# Patient Record
Sex: FEMALE | Race: WHITE | NOT HISPANIC OR LATINO | URBAN - METROPOLITAN AREA
[De-identification: names, ages, dates, MRNs, and addresses within clinical notes are randomized per-mention and may not be internally consistent; named-entity substitution may affect disease eponyms.]

---

## 2022-12-06 ENCOUNTER — OFFICE (OUTPATIENT)
Dept: URBAN - METROPOLITAN AREA CLINIC 67 | Facility: CLINIC | Age: 87
End: 2022-12-06
Payer: MEDICAID

## 2022-12-06 VITALS
WEIGHT: 142 LBS | OXYGEN SATURATION: 90 % | DIASTOLIC BLOOD PRESSURE: 60 MMHG | HEART RATE: 73 BPM | HEIGHT: 60 IN | SYSTOLIC BLOOD PRESSURE: 102 MMHG

## 2022-12-06 DIAGNOSIS — R16.0 HEPATOMEGALY, NOT ELSEWHERE CLASSIFIED: ICD-10-CM

## 2022-12-06 DIAGNOSIS — K76.0 FATTY (CHANGE OF) LIVER, NOT ELSEWHERE CLASSIFIED: ICD-10-CM

## 2022-12-06 DIAGNOSIS — R14.0 ABDOMINAL DISTENSION (GASEOUS): ICD-10-CM

## 2022-12-06 PROCEDURE — 99203 OFFICE O/P NEW LOW 30 MIN: CPT | Performed by: INTERNAL MEDICINE

## 2022-12-06 RX ORDER — LACTOBACIL 2/BIFIDO 1/S.THERMO 450B CELL
PACKET (EA) ORAL
Qty: 12 | Refills: 0 | Status: ACTIVE
Start: 2022-12-06

## 2022-12-06 NOTE — SERVICENOTES
I will check her liver enzymes given the suggestion of fatty liver on CT scan - I will also put her on a trial of a probiotic (samples of VSL #3 provided today) for possible SIBO as a cause for her abdominal bloating. I asked them to call back in 3-4 weeks with an update on how she is doing.

## 2022-12-06 NOTE — SERVICEHPINOTES
Kaci  Shade   is seen for an initial visit today.    
br
br She is accompanied by one of her nurses and reports that for the past 3 months, she has had persistent abdominal distension/bloating with associated postprandial nausea with one episode of emesis. She denies and abdominal pain, dysphagia/odynophagia, GI tract bleeding symptoms or unexplained weight loss. br
br An abdominal ultrasound done on 10/6/22 was remarkable for an enlarged liver and small amount of ascites. However, bra contrasted CT of the abd/pelvis done on 10/21/22 was without any abdominal or pelvic mass or adenopathy. There was no ascites but it did show changes suggestive of mild constipation and fatty liver.

## 2023-01-26 ENCOUNTER — OFFICE (OUTPATIENT)
Dept: URBAN - METROPOLITAN AREA CLINIC 67 | Facility: CLINIC | Age: 88
End: 2023-01-26
Payer: MEDICAID

## 2023-01-26 VITALS
RESPIRATION RATE: 14 BRPM | WEIGHT: 144 LBS | HEIGHT: 60 IN | HEART RATE: 66 BPM | SYSTOLIC BLOOD PRESSURE: 110 MMHG | DIASTOLIC BLOOD PRESSURE: 62 MMHG

## 2023-01-26 DIAGNOSIS — K76.0 FATTY (CHANGE OF) LIVER, NOT ELSEWHERE CLASSIFIED: ICD-10-CM

## 2023-01-26 DIAGNOSIS — Z80.0 FAMILY HISTORY OF MALIGNANT NEOPLASM OF DIGESTIVE ORGANS: ICD-10-CM

## 2023-01-26 DIAGNOSIS — R14.0 ABDOMINAL DISTENSION (GASEOUS): ICD-10-CM

## 2023-01-26 PROCEDURE — 99213 OFFICE O/P EST LOW 20 MIN: CPT | Performed by: INTERNAL MEDICINE

## 2023-01-26 RX ORDER — RIFAXIMIN 550 MG/1
TABLET ORAL
Qty: 36 | Refills: 0 | Status: ACTIVE
Start: 2023-01-26

## 2023-01-26 NOTE — SERVICENOTES
We discussed that SIBO is still a possibility and since she did not have any response to the probiotic trial, I will put her on a course of Rifaximin (samples provided). I asked her to call our office with an update after she finishes or sooner if her symptoms worsen. 
Consideration will be given to a repeat CT scan if her bloating is no better after the course of Rifaximin.

## 2023-01-26 NOTE — SERVICEHPINOTES
Kaci Laguerre   is seen today for a follow-up visit   regarding abdominal bloating and is again accompanied by one of her nurses. An abdominal ultrasound done on 10/6/22 was remarkable for an enlarged liver and small amount of ascites. However, bra contrasted CT of the abd/pelvis done on 10/21/22 was without any abdominal or pelvic mass or adenopathy. There was no ascites but it did show changes suggestive of mild constipation and fatty liver.Blood work done at the time of her last office visit on 12/6/22 revealed normal liver enzymes.brAt that time, I put her on a trial of a probiotic (samples of VSL #3 provided) for possible SIBO as a cause for her abdominal bloating.brToday, she reports that her bloating feels no better after the probiotic trial - she denies any associated abdominal pain, emesis, fever or GI tract bleeding symptoms.